# Patient Record
Sex: FEMALE | Race: WHITE | Employment: PART TIME | URBAN - METROPOLITAN AREA
[De-identification: names, ages, dates, MRNs, and addresses within clinical notes are randomized per-mention and may not be internally consistent; named-entity substitution may affect disease eponyms.]

---

## 2017-11-29 ENCOUNTER — OFFICE VISIT (OUTPATIENT)
Dept: ORTHOPEDIC SURGERY | Age: 47
End: 2017-11-29

## 2017-11-29 VITALS — HEIGHT: 67 IN | BODY MASS INDEX: 25.11 KG/M2 | RESPIRATION RATE: 16 BRPM | WEIGHT: 160 LBS

## 2017-11-29 DIAGNOSIS — M25.361 PATELLAR INSTABILITY OF BOTH KNEES: Primary | ICD-10-CM

## 2017-11-29 DIAGNOSIS — R52 PAIN: ICD-10-CM

## 2017-11-29 DIAGNOSIS — M25.362 PATELLAR INSTABILITY OF BOTH KNEES: Primary | ICD-10-CM

## 2017-11-29 PROCEDURE — G8484 FLU IMMUNIZE NO ADMIN: HCPCS | Performed by: ORTHOPAEDIC SURGERY

## 2017-11-29 PROCEDURE — 99214 OFFICE O/P EST MOD 30 MIN: CPT | Performed by: ORTHOPAEDIC SURGERY

## 2017-11-29 PROCEDURE — G8427 DOCREV CUR MEDS BY ELIG CLIN: HCPCS | Performed by: ORTHOPAEDIC SURGERY

## 2017-11-29 PROCEDURE — 1036F TOBACCO NON-USER: CPT | Performed by: ORTHOPAEDIC SURGERY

## 2017-11-29 PROCEDURE — G8419 CALC BMI OUT NRM PARAM NOF/U: HCPCS | Performed by: ORTHOPAEDIC SURGERY

## 2017-11-29 ASSESSMENT — ENCOUNTER SYMPTOMS
GASTROINTESTINAL NEGATIVE: 1
RESPIRATORY NEGATIVE: 1
EYES NEGATIVE: 1

## 2017-11-29 NOTE — PROGRESS NOTES
Scribe Authentication Statement  Ubaldo Kim, scribed portions of this documentation for and in the presence of Dr. Ulysses Redwood, DO on 11/29/17 at 1:53 PM.    Provider Authentication Statement:  I, Rajendra Javier DO, personally performed the services described in this documentation and they were scribed in my presence by the above listed scribe. The documentation is both accurate and complete. ORTHOPEDIC EVALUATION      History of Present Illness (HPI):   Patient's PCP is listed as: No primary care provider on file. .    This is a:  []Consult Visit     []New Patient Visit     [x]Established Patient Visit    Jose Valle is a 41y.o. year old female evaluated for   Chief Complaint   Patient presents with    Knee Pain     bilateral       Informant patient   Setting when problem first occurred home   Mechanism unknown    Location where pain is most intense Bilateral, anterior   Quality / Description aching and sharp, swelling on right    Severity / Intensity    moderate   Onset Since she was a teenager. On-and-off since then. Timing waxing and waning but worse overall   Radiates does not radiate   Aggravating factors activity   Relieving factors avoiding painful activities   Associated manifestations denies erythema or warmth and denies numbness, tingling, weakness. Buckling but none recent. occasional patella subluxation, but no dislocations. Occasional popping and cracking   Previous tests and diagnostic procedures none   Previous problems in this area none   Previous treatment Therapy many years ago, none recently. Not performing any HEP at this time. Effect on patient's life difficulty with ascending stairs, ladders, or getting up from a squat. Review of Systems (ROS):   Problem = 1 , Extended = 2-9 , Complete = 10  Unless otherwise specified in this note, the R.O.S. is reviewed today with the patient and is as below-    Review of Systems   Constitutional: Negative. HENT: Negative.     Eyes: []Gurney  Weight bearing on injured extremity:  [x]Is able   []Is not able    ORTHOPEDIC KNEE EXAM:     KNEE EXAM:  [x]Right []Left  Circulation:  [x] The limb is warm and well perfused. [x] Capillary refill is intact. [] Edema:    Inspection:  [x] Skin intact without abrasion or lacerations. [x] Leg lengths equal  [] Unequal leg length:  [] Ecchymosis:  [] Abrasion:  [] Laceration:   [] Scar / Surgical incision:  [] Orthopedic appliance:  [] Joint effusion:  []Mild   []Moderate   []Severe    Alignment:  [x] Normal Knee Alignment   Normal Measured Findings Passively Correctable to Normal   Valgus Alignment: []  []   Varus Alignment: []  []     Range of Motion:  [] Normal Knee AROM     [] Normal Knee PROM     [] Deferred due to fracture  Active Knee Flexion: 130° [x]AROM = PROM   Active Knee Extension: full [x]AROM = PROM   Passive Knee Flexion:  []AROM = PROM   Passive Knee Extension:  []AROM = PROM     Motor Function:  [x] No gross motor weakness of hip [x] No gross motor weakness of knee  [x] No gross motor weakness of ankle    [x] No gross motor weakness of great toe  [] Motor weakness:     Neurologic:  [x] Sensation to light touch intact. [] Impaired:  [x] Deep tendon reflexes intact. [] Impaired:  [x] Coordination / proprioception intact. [] Impaired:     Palpation: (Not tested if not marked)     Normal Tenderness Swelling Crepitation Calor   Lateral Joint Line: [x] [] [] [] []   Lateral Collateral Ligament: [] [x] [] [] []   Fibula Head: [x] [] [] [] []   I.T.  Band: [x] [] [] [] []   Lateral Hamstrings: [x] [] [] [] []   Quad tendon: [x] [] [] [] []   Patella: [x] [] [] [] []   Prepatellar Bursa: [x] [] [] [] []   Patella Tendon: [x] [] [] [] []   Tibial Tubercle: [x] [] [] [] []   Medial Joint Line: [] [x] [] [] []   Pes Bursa: [x] [] [] [] []   Pes Tendons: [x] [] [] [] []   Medial Collateral Ligament: [x] [] [] [] []   Popliteal Fossa: [x] [] [] [] []   Medial Head Gastrocnemius: [x] [] [] [] []

## 2017-11-29 NOTE — PATIENT INSTRUCTIONS
· Diagnostic and treatment options discussed. Diagnosis explained. Natural history discussed. Patient's questions answered.   · Physical therapy  · Home exercises given  · Return to the office after therapy

## 2018-02-06 PROBLEM — G47.33 OSA (OBSTRUCTIVE SLEEP APNEA): Status: ACTIVE | Noted: 2018-02-06

## 2018-05-29 PROBLEM — G47.33 OSA ON CPAP: Status: ACTIVE | Noted: 2018-05-29

## 2018-05-29 PROBLEM — Z99.89 OSA ON CPAP: Status: ACTIVE | Noted: 2018-05-29

## 2018-07-25 ENCOUNTER — HOSPITAL ENCOUNTER (OUTPATIENT)
Dept: PHYSICAL THERAPY | Age: 48
Discharge: HOME | End: 2018-07-31

## 2018-07-25 NOTE — PLAN OF CARE
Outpatient Physical Therapy        [] Phone: [phone]   Fax: [phone]   Pediatric Therapy          [] Phone: [phone]   Fax: [phone]  [facility]          [] Phone: [phone]   Fax: [phone]      To: Referring Practitioner: Dr. Lux Dan    From: Marcela Schwartz, PT     Patient: Gena Donovan       : 1970  Diagnosis: maltracking of right and left patellas       Date: 2018    Physical Therapy Certification/Re-Certification Form  Dear Dr. Tahira Euceda following patient has been evaluated for physical therapy services and for therapy to continue, Please review the attached evaluation and/or summary of the patient's plan of care, and verify that you agree therapy should continue by signing the attached document and sending it back to our office. Assessment:Clinical Presentation: stable  Pt presents with chronic excessive  lateral malalignment of both patella ,  Pain is reported 2 to 8/10 . Pt has stable knee ligaments, good strength, mild foot pronation , and normal hip mobility. Today she started with a strengthening exs of hip and knee mm, stretching hamstrings, trial of kinesiotape to limit lateral subluxation of patella. Pt states she will be on her husbands insurance in a few weeks and requests to return to PT when she has insurance. She easily learned the home exs and asked to exercise 5x a  Week. Planned Services:  [x] Therapeutic Exercise    [] Aquatics:  [] Therapeutic Activity    [] Ultrasound  [] Elec Stimulation  [] Gait Training     [] Cervical Traction [] Lumbar Traction  [x] Neuromuscular Re-education [] Cold/hotpack [] Iontophoresis   [x] Instruction in HEP       [] Manual Therapy     [] vasopneumatic            [] Self care home management        []Dry needling trigger point point/pain management      Plan of Care Date Range:      ? Frequency/Duration:  # Days per week: [x] 1 day # Weeks: [] 1 week [] 5 weeks     [] 2 days?    [] 2 weeks [] 6 weeks     [] 3

## 2018-07-25 NOTE — FLOWSHEET NOTE
Physical Therapy Daily Treatment Note  Date:  2018    Patient Name:  Donovan Swenson    :  1970  MRN: [id number]      Diagnosis: maltracking of right and left patellas     PT Insurance Information: no insurance yet  Referring Practitioner: Dr. Beth Nielson  Referring Practitioner Follow-Up:     POC Signed: pending  POC Date Range:    Progress Note Due:    Visit# / total visits:  /                    Goals:     Long term goals  Time Frame for Long term goals : 60 days  Long term goal 1: decrease pain 50% or more to 0 to 3/10  Long term goal 2: indep with home program  Long term goal 3: improve functional score KOOS by 4 points  Patient goals : no knee pain    Summary of Eval:   Clinical Presentation: stable  Pt presents with chronic excessive  lateral malalignment of both patella ,  Pain is reported 2 to 8/10 . Pt has stable knee ligaments, good strength, mild foot pronation , and normal hip mobility. Today she started with a strengthening exs of hip and knee mm, stretching hamstrings, trial of kinesiotape to limit lateral subluxation of patella. Pt states she will be on her husbands insurance in a few weeks and requests to return to PT when she has insurance. She easily learned the home exs and asked to exercise 5x weeks    INITIAL PAIN LEVEL:  /10   SUBJECTIVE:      Any changes to Ambulatory Summary Sheet? Any major status changes? Skilled PT activities: Date 18 Date Date Date   Outcome measure   KOOS 11         VMO 10x each hold 5 sec work up to 10 sec        Clamshell 1 and 2 10x each hold 10 sec         Stretch hamstrings sitting  3x 20 sec each        Wall sits knees to 35 degree flex only  3x 10 sec comfortable        Walk sideways  Blue tband above knees  2 laps        kinesiotape lateral patella to limit lateral subluxation 2 C strips applied,  Pt taped one knee and I did the other. She is indepn with taping.     Extra tape given

## 2018-07-25 NOTE — PROGRESS NOTES
Preffered learning style:   Demonstration  Written form   Verbal instruction   All     Assessment   Conditions Requiring Skilled Therapeutic Intervention  Body structures, Functions, Activity limitations: Decreased functional mobility   Prognosis: Good  Decision Making: Low Complexity  History: orthopedic statement  Exam: range of motion, strength, gait, lig stability  Clinical Presentation: stable  Pt presents with chronic excessive  lateral malalignment of both patella ,  Pain is reported 2 to 8/10 . Pt has stable knee ligaments, good strength, mild foot pronation , and normal hip mobility. Today she started with a strengthening exs of hip and knee mm, stretching hamstrings, trial of kinesiotape to limit lateral subluxation of patella. Pt states she will be on her husbands insurance in a few weeks and requests to return to PT when she has insurance. She easily learned the home exs and asked to exercise 5x weeks  Patient Education: written  Barriers to Learning: none  REQUIRES PT FOLLOW UP: Yes         Plan   Plan  Times per week: 1 x week  Plan weeks: 4 to 12 weeks   Current Treatment Recommendations: Strengthening, Home Exercise Program, Neuromuscular Re-education    G-Code  PT G-Codes  Functional Assessment Tool Used: koos  Score: 11  Functional Limitation: Mobility: Walking and moving around  Mobility: Walking and Moving Around Current Status (): At least 20 percent but less than 40 percent impaired, limited or restricted  Mobility: Walking and Moving Around Goal Status ():  At least 1 percent but less than 20 percent impaired, limited or restricted    Goals  Long term goals  Time Frame for Long term goals : 60 days  Long term goal 1: decrease pain 50% or more to 0 to 3/10  Long term goal 2: indep with home program  Long term goal 3: improve functional score KOOS by 4 points  Patient Goals   Patient goals : no knee pain       Therapy Time   Individual Concurrent Group Co-treatment   Time In 1500         Time Out 1545         Minutes 45         Timed Code Treatment Minutes: Tolu 83, PT

## 2018-08-01 ENCOUNTER — HOSPITAL ENCOUNTER (OUTPATIENT)
Dept: OTHER | Age: 48
Discharge: HOME | End: 2018-08-31

## 2019-08-13 ENCOUNTER — HOSPITAL ENCOUNTER (EMERGENCY)
Age: 49
Discharge: HOME | End: 2019-08-13
Payer: COMMERCIAL

## 2019-08-13 VITALS
DIASTOLIC BLOOD PRESSURE: 88 MMHG | TEMPERATURE: 98.2 F | RESPIRATION RATE: 18 BRPM | OXYGEN SATURATION: 99 % | HEIGHT: 67 IN | WEIGHT: 173 LBS | SYSTOLIC BLOOD PRESSURE: 153 MMHG | BODY MASS INDEX: 27.15 KG/M2 | HEART RATE: 90 BPM

## 2019-08-13 DIAGNOSIS — S61.210A LACERATION OF RIGHT INDEX FINGER WITHOUT FOREIGN BODY WITHOUT DAMAGE TO NAIL, INITIAL ENCOUNTER: Primary | ICD-10-CM

## 2019-08-13 PROCEDURE — 99282 EMERGENCY DEPT VISIT SF MDM: CPT

## 2019-08-13 PROCEDURE — 2500000003 HC RX 250 WO HCPCS: Performed by: PHYSICIAN ASSISTANT

## 2019-08-13 PROCEDURE — 90715 TDAP VACCINE 7 YRS/> IM: CPT | Performed by: PHYSICIAN ASSISTANT

## 2019-08-13 PROCEDURE — 6360000002 HC RX W HCPCS: Performed by: PHYSICIAN ASSISTANT

## 2019-08-13 PROCEDURE — 4500000027

## 2019-08-13 PROCEDURE — 90471 IMMUNIZATION ADMIN: CPT | Performed by: PHYSICIAN ASSISTANT

## 2019-08-13 RX ORDER — LIDOCAINE HYDROCHLORIDE 20 MG/ML
5 INJECTION, SOLUTION EPIDURAL; INFILTRATION; INTRACAUDAL; PERINEURAL ONCE
Status: COMPLETED | OUTPATIENT
Start: 2019-08-13 | End: 2019-08-13

## 2019-08-13 RX ADMIN — TETANUS TOXOID, REDUCED DIPHTHERIA TOXOID AND ACELLULAR PERTUSSIS VACCINE, ADSORBED 0.5 ML: 5; 2.5; 8; 8; 2.5 SUSPENSION INTRAMUSCULAR at 17:20

## 2019-08-13 RX ADMIN — LIDOCAINE HYDROCHLORIDE 5 ML: 20 INJECTION, SOLUTION EPIDURAL; INFILTRATION; INTRACAUDAL; PERINEURAL at 17:20

## 2019-08-13 ASSESSMENT — PAIN DESCRIPTION - PAIN TYPE: TYPE: ACUTE PAIN

## 2019-08-13 ASSESSMENT — PAIN DESCRIPTION - DESCRIPTORS: DESCRIPTORS: ACHING

## 2019-08-13 ASSESSMENT — PAIN SCALES - GENERAL: PAINLEVEL_OUTOF10: 6

## 2019-08-13 ASSESSMENT — PAIN DESCRIPTION - LOCATION: LOCATION: FINGER (COMMENT WHICH ONE)

## 2019-08-13 ASSESSMENT — PAIN DESCRIPTION - ORIENTATION: ORIENTATION: RIGHT

## 2019-08-13 NOTE — ED PROVIDER NOTES
GABRIELLA      Laceration Repair Procedure Note    Indication: Skin Laceration - right index finger    Procedure:   - Procedure explained, including risks and benefits explained to the patient who expressed understanding. All questions were answered. Verbal consent obtained. - The Wound was prepped and draped in the usual sterile fashion using Hibiclens and sterile saline.  - The wound is anesthetized using 2% lidocaine using 1.5 mL  - Wound was explored to it's depth, no compromise of neurovascular or tendon structures, no foreign bodies. - Wound was irrigated with copious amounts of sterile saline and mechanically debrided utilizing sterile gauze. - The laceration was Closed with 5-0 prolene sutures, total number of 6, simple interrupted  - Hemostasis and good cosmesis was achieved. Blood loss minimal.  - The wound area was then dressed with Sterile nonstick dressing, sterile gauze, and tape. - Patient tolerated procedure well without complications. Total repaired wound length: 3 cm  ________________________________________________________________________          ED COURSE & MEDICAL DECISION MAKING      Presents as above. No bony tenderness. Patient up-to-date on tetanus. I discussed possibility of infection, retained foreign body, tendon injury, nerve injury. See above procedure note. Clinical  IMPRESSION    1. Laceration of right index finger without foreign body without damage to nail, initial encounter        Wound care instructions discussed with patient today. Wound check in 2-3 days. Suture removal in 10 days. (discussed today). Diagnosis and plan discussed in detail with patient who understands and agrees. Return to emergency Department precautions were discussed in detail with patient who understands and agrees.       Comment: Please note this report has been produced using speech recognition software and may contain errors related to that system including errors in grammar,

## 2021-11-18 ENCOUNTER — TELEPHONE (OUTPATIENT)
Dept: NEUROLOGY | Age: 51
End: 2021-11-18

## 2021-11-18 NOTE — TELEPHONE ENCOUNTER
Left message for patient to call and make appointment for EMG ordered by Dr. Eloy Alford in our [address] [place] [address] office.

## 2024-05-30 ENCOUNTER — PREP FOR PROCEDURE (OUTPATIENT)
Dept: PODIATRY | Facility: HOSPITAL | Age: 54
End: 2024-05-30
Payer: COMMERCIAL

## 2024-05-30 DIAGNOSIS — M20.12 VALGUS DEFORMITY OF BOTH GREAT TOES: Primary | ICD-10-CM

## 2024-05-30 DIAGNOSIS — M20.11 VALGUS DEFORMITY OF BOTH GREAT TOES: Primary | ICD-10-CM

## 2024-06-18 ENCOUNTER — ANESTHESIA EVENT (OUTPATIENT)
Dept: OPERATING ROOM | Facility: HOSPITAL | Age: 54
End: 2024-06-18
Payer: COMMERCIAL

## 2024-06-20 ENCOUNTER — PHARMACY VISIT (OUTPATIENT)
Dept: PHARMACY | Facility: CLINIC | Age: 54
End: 2024-06-20
Payer: MEDICARE

## 2024-06-20 ENCOUNTER — ANESTHESIA (OUTPATIENT)
Dept: OPERATING ROOM | Facility: HOSPITAL | Age: 54
End: 2024-06-20
Payer: COMMERCIAL

## 2024-06-20 ENCOUNTER — HOSPITAL ENCOUNTER (OUTPATIENT)
Dept: CARDIOLOGY | Facility: HOSPITAL | Age: 54
Discharge: HOME | End: 2024-06-20
Payer: COMMERCIAL

## 2024-06-20 ENCOUNTER — HOSPITAL ENCOUNTER (OUTPATIENT)
Dept: RADIOLOGY | Facility: HOSPITAL | Age: 54
Discharge: HOME | End: 2024-06-20
Payer: COMMERCIAL

## 2024-06-20 ENCOUNTER — HOSPITAL ENCOUNTER (OUTPATIENT)
Facility: HOSPITAL | Age: 54
Setting detail: OUTPATIENT SURGERY
Discharge: HOME | End: 2024-06-20
Attending: PODIATRIST | Admitting: PODIATRIST
Payer: COMMERCIAL

## 2024-06-20 VITALS
DIASTOLIC BLOOD PRESSURE: 78 MMHG | SYSTOLIC BLOOD PRESSURE: 129 MMHG | RESPIRATION RATE: 16 BRPM | HEART RATE: 72 BPM | TEMPERATURE: 97.2 F | HEIGHT: 67 IN | BODY MASS INDEX: 26.96 KG/M2 | WEIGHT: 171.74 LBS | OXYGEN SATURATION: 96 %

## 2024-06-20 DIAGNOSIS — M20.11 VALGUS DEFORMITY OF BOTH GREAT TOES: Primary | ICD-10-CM

## 2024-06-20 DIAGNOSIS — M20.10 HALLUX ABDUCTO VALGUS: ICD-10-CM

## 2024-06-20 DIAGNOSIS — M20.12 VALGUS DEFORMITY OF BOTH GREAT TOES: Primary | ICD-10-CM

## 2024-06-20 PROCEDURE — 7100000002 HC RECOVERY ROOM TIME - EACH INCREMENTAL 1 MINUTE: Performed by: PODIATRIST

## 2024-06-20 PROCEDURE — C1713 ANCHOR/SCREW BN/BN,TIS/BN: HCPCS | Performed by: PODIATRIST

## 2024-06-20 PROCEDURE — 7100000010 HC PHASE TWO TIME - EACH INCREMENTAL 1 MINUTE: Performed by: PODIATRIST

## 2024-06-20 PROCEDURE — 2720000007 HC OR 272 NO HCPCS: Performed by: PODIATRIST

## 2024-06-20 PROCEDURE — 7100000009 HC PHASE TWO TIME - INITIAL BASE CHARGE: Performed by: PODIATRIST

## 2024-06-20 PROCEDURE — 3600000008 HC OR TIME - EACH INCREMENTAL 1 MINUTE - PROCEDURE LEVEL THREE: Performed by: PODIATRIST

## 2024-06-20 PROCEDURE — 2500000005 HC RX 250 GENERAL PHARMACY W/O HCPCS: Performed by: PODIATRIST

## 2024-06-20 PROCEDURE — 2500000004 HC RX 250 GENERAL PHARMACY W/ HCPCS (ALT 636 FOR OP/ED): Performed by: PODIATRIST

## 2024-06-20 PROCEDURE — 2500000004 HC RX 250 GENERAL PHARMACY W/ HCPCS (ALT 636 FOR OP/ED): Performed by: ANESTHESIOLOGY

## 2024-06-20 PROCEDURE — 2500000005 HC RX 250 GENERAL PHARMACY W/O HCPCS: Performed by: NURSE ANESTHETIST, CERTIFIED REGISTERED

## 2024-06-20 PROCEDURE — 76000 FLUOROSCOPY <1 HR PHYS/QHP: CPT

## 2024-06-20 PROCEDURE — 2500000004 HC RX 250 GENERAL PHARMACY W/ HCPCS (ALT 636 FOR OP/ED): Performed by: NURSE ANESTHETIST, CERTIFIED REGISTERED

## 2024-06-20 PROCEDURE — 7100000001 HC RECOVERY ROOM TIME - INITIAL BASE CHARGE: Performed by: PODIATRIST

## 2024-06-20 PROCEDURE — RXMED WILLOW AMBULATORY MEDICATION CHARGE

## 2024-06-20 PROCEDURE — 93005 ELECTROCARDIOGRAM TRACING: CPT

## 2024-06-20 PROCEDURE — 2780000003 HC OR 278 NO HCPCS: Performed by: PODIATRIST

## 2024-06-20 PROCEDURE — 3600000003 HC OR TIME - INITIAL BASE CHARGE - PROCEDURE LEVEL THREE: Performed by: PODIATRIST

## 2024-06-20 PROCEDURE — 3700000002 HC GENERAL ANESTHESIA TIME - EACH INCREMENTAL 1 MINUTE: Performed by: PODIATRIST

## 2024-06-20 PROCEDURE — 3700000001 HC GENERAL ANESTHESIA TIME - INITIAL BASE CHARGE: Performed by: PODIATRIST

## 2024-06-20 DEVICE — IMPLANTABLE DEVICE: Type: IMPLANTABLE DEVICE | Site: FOOT | Status: FUNCTIONAL

## 2024-06-20 RX ORDER — MIDAZOLAM HYDROCHLORIDE 1 MG/ML
INJECTION INTRAMUSCULAR; INTRAVENOUS AS NEEDED
Status: DISCONTINUED | OUTPATIENT
Start: 2024-06-20 | End: 2024-06-20

## 2024-06-20 RX ORDER — LIDOCAINE HYDROCHLORIDE 20 MG/ML
INJECTION, SOLUTION INFILTRATION; PERINEURAL AS NEEDED
Status: DISCONTINUED | OUTPATIENT
Start: 2024-06-20 | End: 2024-06-20

## 2024-06-20 RX ORDER — ONDANSETRON HYDROCHLORIDE 2 MG/ML
INJECTION, SOLUTION INTRAVENOUS AS NEEDED
Status: DISCONTINUED | OUTPATIENT
Start: 2024-06-20 | End: 2024-06-20

## 2024-06-20 RX ORDER — LIDOCAINE HYDROCHLORIDE 10 MG/ML
0.1 INJECTION, SOLUTION EPIDURAL; INFILTRATION; INTRACAUDAL; PERINEURAL ONCE
Status: DISCONTINUED | OUTPATIENT
Start: 2024-06-20 | End: 2024-06-20 | Stop reason: HOSPADM

## 2024-06-20 RX ORDER — ONDANSETRON HYDROCHLORIDE 2 MG/ML
8 INJECTION, SOLUTION INTRAVENOUS ONCE
Status: DISCONTINUED | OUTPATIENT
Start: 2024-06-20 | End: 2024-06-20 | Stop reason: HOSPADM

## 2024-06-20 RX ORDER — ALBUTEROL SULFATE 0.83 MG/ML
2.5 SOLUTION RESPIRATORY (INHALATION) ONCE AS NEEDED
Status: DISCONTINUED | OUTPATIENT
Start: 2024-06-20 | End: 2024-06-20 | Stop reason: HOSPADM

## 2024-06-20 RX ORDER — CEFAZOLIN 1 G/1
INJECTION, POWDER, FOR SOLUTION INTRAVENOUS AS NEEDED
Status: DISCONTINUED | OUTPATIENT
Start: 2024-06-20 | End: 2024-06-20

## 2024-06-20 RX ORDER — LIDOCAINE HYDROCHLORIDE 10 MG/ML
INJECTION INFILTRATION; PERINEURAL AS NEEDED
Status: DISCONTINUED | OUTPATIENT
Start: 2024-06-20 | End: 2024-06-20 | Stop reason: HOSPADM

## 2024-06-20 RX ORDER — BUPIVACAINE HYDROCHLORIDE 5 MG/ML
INJECTION, SOLUTION EPIDURAL; INTRACAUDAL AS NEEDED
Status: DISCONTINUED | OUTPATIENT
Start: 2024-06-20 | End: 2024-06-20 | Stop reason: HOSPADM

## 2024-06-20 RX ORDER — IBUPROFEN 200 MG
400 TABLET ORAL EVERY 6 HOURS
COMMUNITY

## 2024-06-20 RX ORDER — ACETAMINOPHEN 325 MG/1
650 TABLET ORAL EVERY 4 HOURS PRN
Status: DISCONTINUED | OUTPATIENT
Start: 2024-06-20 | End: 2024-06-20 | Stop reason: HOSPADM

## 2024-06-20 RX ORDER — FENTANYL CITRATE 50 UG/ML
INJECTION, SOLUTION INTRAMUSCULAR; INTRAVENOUS AS NEEDED
Status: DISCONTINUED | OUTPATIENT
Start: 2024-06-20 | End: 2024-06-20

## 2024-06-20 RX ORDER — SODIUM CHLORIDE, SODIUM LACTATE, POTASSIUM CHLORIDE, CALCIUM CHLORIDE 600; 310; 30; 20 MG/100ML; MG/100ML; MG/100ML; MG/100ML
100 INJECTION, SOLUTION INTRAVENOUS CONTINUOUS
Status: DISCONTINUED | OUTPATIENT
Start: 2024-06-20 | End: 2024-06-20 | Stop reason: HOSPADM

## 2024-06-20 RX ORDER — PROPOFOL 10 MG/ML
INJECTION, EMULSION INTRAVENOUS AS NEEDED
Status: DISCONTINUED | OUTPATIENT
Start: 2024-06-20 | End: 2024-06-20

## 2024-06-20 RX ORDER — DEXMEDETOMIDINE IN 0.9 % NACL 20 MCG/5ML
SYRINGE (ML) INTRAVENOUS AS NEEDED
Status: DISCONTINUED | OUTPATIENT
Start: 2024-06-20 | End: 2024-06-20

## 2024-06-20 RX ORDER — OXYCODONE AND ACETAMINOPHEN 5; 325 MG/1; MG/1
1 TABLET ORAL EVERY 6 HOURS PRN
Qty: 28 TABLET | Refills: 0 | Status: SHIPPED | OUTPATIENT
Start: 2024-06-20 | End: 2024-06-27

## 2024-06-20 RX ORDER — PHENYLEPHRINE HCL IN 0.9% NACL 1 MG/10 ML
SYRINGE (ML) INTRAVENOUS AS NEEDED
Status: DISCONTINUED | OUTPATIENT
Start: 2024-06-20 | End: 2024-06-20

## 2024-06-20 RX ORDER — DEXMEDETOMIDINE HYDROCHLORIDE 4 UG/ML
.1-1.5 INJECTION, SOLUTION INTRAVENOUS CONTINUOUS
Status: CANCELLED | OUTPATIENT
Start: 2024-06-20 | End: 2024-06-20

## 2024-06-20 RX ADMIN — SODIUM CHLORIDE, POTASSIUM CHLORIDE, SODIUM LACTATE AND CALCIUM CHLORIDE 100 ML/HR: 600; 310; 30; 20 INJECTION, SOLUTION INTRAVENOUS at 13:11

## 2024-06-20 SDOH — HEALTH STABILITY: MENTAL HEALTH: CURRENT SMOKER: 1

## 2024-06-20 ASSESSMENT — COLUMBIA-SUICIDE SEVERITY RATING SCALE - C-SSRS
2. HAVE YOU ACTUALLY HAD ANY THOUGHTS OF KILLING YOURSELF?: NO
6. HAVE YOU EVER DONE ANYTHING, STARTED TO DO ANYTHING, OR PREPARED TO DO ANYTHING TO END YOUR LIFE?: NO
1. IN THE PAST MONTH, HAVE YOU WISHED YOU WERE DEAD OR WISHED YOU COULD GO TO SLEEP AND NOT WAKE UP?: NO

## 2024-06-20 ASSESSMENT — PAIN SCALES - GENERAL
PAINLEVEL_OUTOF10: 0 - NO PAIN
PAINLEVEL_OUTOF10: 0 - NO PAIN

## 2024-06-20 ASSESSMENT — PAIN - FUNCTIONAL ASSESSMENT: PAIN_FUNCTIONAL_ASSESSMENT: 0-10

## 2024-06-20 NOTE — ANESTHESIA PROCEDURE NOTES
Airway  Date/Time: 6/20/2024 1:51 PM  Urgency: elective    Airway not difficult    Staffing  Performed: CRNA   Authorized by: LENIN Guzman-FRANCES    Performed by: LENIN Guzman-FRANCES  Patient location during procedure: OR    Indications and Patient Condition  Indications for airway management: anesthesia and airway protection  Spontaneous ventilation: present  Sedation level: deep  Preoxygenated: yes  Mask difficulty assessment: 0 - not attempted    Final Airway Details  Final airway type: supraglottic airway      Successful airway: air-Q  Size 4     Number of attempts at approach: 1

## 2024-06-20 NOTE — ANESTHESIA POSTPROCEDURE EVALUATION
Patient: Toño Costa    Procedure Summary       Date: 06/20/24 Room / Location: [facility] OR 08 / [facility]    Anesthesia Start: 1345 Anesthesia Stop: 1548    Procedure: REPAIR HALLUX VALGUS (Bilateral: Foot) Diagnosis:       Valgus deformity of both great toes      (Valgus deformity of both great toes [M20.11, M20.12])    Surgeons: Huma Caraballo DPM Responsible Provider: EVANGELISTA Guzman    Anesthesia Type: general ASA Status: 2            Anesthesia Type: general    Vitals Value Taken Time   /71 06/20/24 1546   Temp 36.2 °C (97.2 °F) 06/20/24 1546   Pulse 79 06/20/24 1546   Resp 15 06/20/24 1546   SpO2 99 % 06/20/24 1546       Anesthesia Post Evaluation    Patient location during evaluation: PACU  Patient participation: complete - patient participated  Level of consciousness: awake  Pain management: adequate  Multimodal analgesia pain management approach  Airway patency: patent  Two or more strategies used to mitigate risk of obstructive sleep apnea  Cardiovascular status: acceptable  Respiratory status: acceptable  Hydration status: acceptable  Postoperative Nausea and Vomiting: none        No notable events documented.

## 2024-06-20 NOTE — H&P
"History Of Present Illness  Toño Costa is a 54 y.o. female presenting with B/L hallux valgus.  Has exhausted conservative treatment..     Past Medical History  Past Medical History:   Diagnosis Date    Right ankle sprain 05/31/2023    Valgus deformity of both great toes        Surgical History  Past Surgical History:   Procedure Laterality Date    COLONOSCOPY Bilateral 2021        Social History  She has no history on file for tobacco use, alcohol use, and drug use.    Family History  No family history on file.     Allergies  Patient has no allergy information on record.    Review of Systems   Negative except for B/L hallux joint pain    Physical Exam  Constitutional:       Appearance: Normal appearance.   HENT:      Head: Normocephalic.      Nose: Nose normal.      Mouth/Throat:      Mouth: Mucous membranes are moist.      Pharynx: Oropharynx is clear.   Eyes:      Extraocular Movements: Extraocular movements intact.      Pupils: Pupils are equal, round, and reactive to light.   Cardiovascular:      Rate and Rhythm: Normal rate and regular rhythm.   Pulmonary:      Effort: Pulmonary effort is normal.   Abdominal:      General: Abdomen is flat. Bowel sounds are normal.   Musculoskeletal:         General: Deformity and signs of injury present.      Cervical back: Normal range of motion.      Right foot: Bunion present.      Left foot: Bunion present.   Skin:     General: Skin is warm.   Neurological:      General: No focal deficit present.      Mental Status: She is alert.   Psychiatric:         Mood and Affect: Mood normal.          Last Recorded Vitals  Blood pressure (!) 169/97, pulse 73, temperature 36.6 °C (97.9 °F), resp. rate 16, height 1.702 m (5' 7\"), weight 77.9 kg (171 lb 11.8 oz), SpO2 99%.    Relevant Results        Assessment/Plan   Principal Problem:    Valgus deformity of both great toes       B/L hallux valgus  Plan for OR bunionectomy B/L feet.       I spent 15 minutes in the professional and " overall care of this patient.      Huma Caraballo DPM

## 2024-06-20 NOTE — ANESTHESIA PREPROCEDURE EVALUATION
"Patient: Toño Costa    Procedure Information       Date/Time: 06/20/24 1330    Procedure: REPAIR HALLUX VALGUS (Bilateral)    Location: [facility] / [facility]    Surgeons: Huma Caraballo DPM     Vitals:    06/20/24 1219   BP: (!) 169/97   Pulse: 73   Resp: 16   Temp: 36.6 °C (97.9 °F)   SpO2: 99%       Past Surgical History:   Procedure Laterality Date    COLONOSCOPY Bilateral 2021     Past Medical History:   Diagnosis Date    Right ankle sprain 05/31/2023    Sleep apnea     Valgus deformity of both great toes        Current Facility-Administered Medications:     lactated Ringer's infusion, 100 mL/hr, intravenous, Continuous, Jackson Chu MD, Last Rate: 100 mL/hr at 06/20/24 1311, 100 mL/hr at 06/20/24 1311  Prior to Admission medications    Medication Sig Start Date End Date Taking? Authorizing Provider   ibuprofen 200 mg tablet Take 2 tablets (400 mg) by mouth every 6 hours.   Yes Historical Provider, MD     No Known Allergies  Social History     Tobacco Use    Smoking status: Every Day     Current packs/day: 1.00     Average packs/day: 1 pack/day for 35.5 years (35.5 ttl pk-yrs)     Types: Cigarettes     Start date: 1989    Smokeless tobacco: Never   Substance Use Topics    Alcohol use: Never         Chemistry    No results found for: \"NA\", \"K\", \"CL\", \"CO2\", \"BUN\", \"CREATININE\", \"GLU\" No results found for: \"CALCIUM\", \"ALKPHOS\", \"AST\", \"ALT\", \"BILITOT\"       No results found for: \"WBC\", \"HGB\", \"HCT\", \"PLT\"  No results found for: \"PROTIME\", \"PTT\", \"INR\"  No results found for this or any previous visit (from the past 4464 hour(s)).  No results found for this or any previous visit from the past 1095 days.        Relevant Problems   No relevant active problems       Clinical information reviewed:   Tobacco  Allergies  Meds   Med Hx  Surg Hx  OB Status  Fam Hx  Soc   Hx        NPO Detail:  NPO/Void Status  Date of Last Liquid: 06/20/24  Time of Last Liquid: 0000  Date of Last Solid: " 06/20/24  Time of Last Solid: 0000         Physical Exam    Airway  Mallampati: II  TM distance: >3 FB  Neck ROM: full     Cardiovascular - normal exam     Dental    Pulmonary - normal exam  (+) decreased breath sounds     Abdominal - normal exam           Anesthesia Plan    History of general anesthesia?: yes  History of complications of general anesthesia?: no    ASA 2     general     The patient is a current smoker.    intravenous induction   Postoperative administration of opioids is intended.  Trial extubation is planned.  Anesthetic plan and risks discussed with patient.  Use of blood products discussed with patient who.    Plan discussed with CRNA and attending.

## 2024-06-20 NOTE — BRIEF OP NOTE
Date: 2024  OR Location: [facility] OR    Name: Toño Costa, : 1970, Age: 54 y.o., MRN: [id number], Sex: female    Diagnosis  Pre-op Diagnosis     * Valgus deformity of both great toes [M20.11, M20.12] Post-op Diagnosis     * Valgus deformity of both great toes [M20.11, M20.12]     Procedures  Chevron-Barry Bunionectomies B/L     Surgeons      * Huma Caraballo - Primary    Resident/Fellow/Other Assistant:  Surgeons and Role:  * No surgeons found with a matching role *    Procedure Summary  Anesthesia: General  ASA: II  Anesthesia Staff: Anesthesiologist: Jackson Chu MD; Toan Dwyer DO  CRNA: LENIN Guzman-CRNA  Estimated Blood Loss: 5 mL  Intra-op Medications:   Administrations occurring from 1330 to 1530 on 24:   Medication Name Total Dose   lidocaine (Xylocaine) 10 mg/mL (1 %) injection 5 mL   BUPivacaine HCl (Marcaine) 0.5 % (5 mg/mL) 5 mL, lidocaine PF (Xylocaine) 10 mg/mL (1 %) 5 mL syringe 10 mL   BUPivacaine HCl (Marcaine) 0.5 % (5 mg/mL) 5 mL, lidocaine PF (Xylocaine) 10 mg/mL (1 %) 5 mL syringe 10 mL              Anesthesia Record               Intraprocedure I/O Totals          Intake    lactated Ringer's infusion 1000.00 mL    Total Intake 1000 mL          Specimen: No specimens collected     Staff:   Circulator: Sydney Rogers Person: Rosalina  Circulator: Christal          Findings: as dictated    Complications:  None; patient tolerated the procedure well.     Disposition: PACU - hemodynamically stable.  Condition: stable  Specimens Collected: No specimens collected  Attending Attestation: I performed the procedure.    Huma Caraballo  Phone Number: [phone]

## 2024-06-21 LAB
ATRIAL RATE: 74 BPM
P AXIS: 50 DEGREES
P OFFSET: 211 MS
P ONSET: 163 MS
PR INTERVAL: 104 MS
Q ONSET: 215 MS
QRS COUNT: 12 BEATS
QRS DURATION: 88 MS
QT INTERVAL: 422 MS
QTC CALCULATION(BAZETT): 468 MS
QTC FREDERICIA: 452 MS
R AXIS: 24 DEGREES
T AXIS: -6 DEGREES
T OFFSET: 426 MS
VENTRICULAR RATE: 74 BPM

## 2024-06-22 NOTE — OP NOTE
Forms printed and given to provider to review and sign.   REPAIR HALLUX VALGUS (B) Operative Note     Date: 2024  OR Location: [facility] OR    Name: Toño Costa, : 1970, Age: 54 y.o., MRN: [id number], Sex: female    Diagnosis  Pre-op Diagnosis     * Valgus deformity of both great toes [M20.11, M20.12] Post-op Diagnosis     * Valgus deformity of both great toes [M20.11, M20.12]     Procedures  REPAIR HALLUX VALGUS  49223 - MS CORRJ HLX VLGS BNCTY SESMDC DSTL METAR OSTEOT      Surgeons      * Huma Caraballo - Primary    Resident/Fellow/Other Assistant:  Surgeons and Role:  * No surgeons found with a matching role *    Procedure Summary  Anesthesia: General  ASA: II  Anesthesia Staff: Anesthesiologist: Jackson Chu MD; Toan Dwyer DO  CRNA: LENIN Guzman-CRNA  Estimated Blood Loss: 5 mL  Intra-op Medications:   Administrations occurring from 1330 to 1530 on 24:   Medication Name Total Dose   lidocaine (Xylocaine) 10 mg/mL (1 %) injection 5 mL   BUPivacaine HCl (Marcaine) 0.5 % (5 mg/mL) 5 mL, lidocaine PF (Xylocaine) 10 mg/mL (1 %) 5 mL syringe 10 mL   BUPivacaine HCl (Marcaine) 0.5 % (5 mg/mL) 5 mL, lidocaine PF (Xylocaine) 10 mg/mL (1 %) 5 mL syringe 10 mL              Anesthesia Record               Intraprocedure I/O Totals          Intake    lactated Ringer's infusion 1000.00 mL    Total Intake 1000 mL          Specimen: No specimens collected     Staff:   Circulator: Sydney Rogers Person: Rosalina  Circulator: Christal         Drains and/or Catheters: * None in log *    Tourniquet Times:     Total Tourniquet Time Documented:  Calf (Left) - 52 minutes  Total: Calf (Left) - 52 minutes    Calf (Right) - 50 minutes  Total: Calf (Right) - 50 minutes      Implants:  Implants       Type Name Action Serial No.      Screw KWIRE, 1.15MM, NON THREADED, MINI - SNA - [id number] Implanted NA     Screw 3.0 x 20MM HEADED SCREW Implanted NA     Screw SCREW, HEADED, 3.0MM X 28MM, MINI - SNA - [id number] Implanted NA     Screw KWIRE, 1.15MM, NON THREADED,  MINI - SNA - [id number] Implanted NA     Screw SCREW, HEADED, 3.0 X 26MM, MINI - SNA - [id number] Implanted NA     Screw 3.0 x 18MM HEADED SCREW Implanted NA              Findings: as dictated    Indications: Toño Costa is an 54 y.o. female who is having surgery for Valgus deformity of both great toes [M20.11, M20.12].      The patient was seen in the preoperative area. The risks, benefits, complications, treatment options, non-operative alternatives, expected recovery and outcomes were discussed with the patient. The possibilities of reaction to medication, pulmonary aspiration, injury to surrounding structures, bleeding, recurrent infection, the need for additional procedures, failure to diagnose a condition, and creating a complication requiring transfusion or operation were discussed with the patient. The patient concurred with the proposed plan, giving informed consent.  The site of surgery was properly noted/marked if necessary per policy. The patient has been actively warmed in preoperative area. Preoperative antibiotics have been ordered and given within 1 hours of incision. Venous thrombosis prophylaxis are not indicated.    Procedure Details:   Patient brought to operating room and transferred to OR bed for surgical procedure.  Huddle and Time out performed.  After induction of general anesthesia 20 ml's of 1:1 mixture of 1% lidocaine plain and 0.5% marcaine plain injected around B/L 1st MPJ in ford block fashion. B/L feet scrubbed, prepped and draped with chloroprep.    The left foot was then exanguinated with an esmarc bandage and the tourniquet was inflated to 250 mmHg.    Attention was then directed to left foot  where a 5.0 cm incision was made from medial aspect of hallux IPJ proximally to midshaft of   1st metatarsal using a #15 blade.  The incision was carried down throught subcutaneous tissue with care being taken to retract and identify and neurovascular structures.  Any bleeding controlled  with bovie electrocautery.  At level of capsule, a medial capsular incision was made.  Any soft tissue was freed from the first MPJ.   Utilized a mcglammery elevator, the sesamoid apparatus was freed.  We then placed a malleable retractor under first metatarsal head and utilized a small power sagital saw to remove the medial emminance of first metatarsal head.  We then redirected our attention to medial aspect of first metatarsal head,  we created a chevron osteotomy in first metatarsal head utilizing a power sagital saw.  All four cortices were breached, thus allowing first metatarsal head to be slid in a lateral direction approximately 3 mm.   We then utilized a k-wire from jazmyne 3-0 cannulated screw set to fixate osteotomy in first metatarsal from proximal to distal across osteotomy site.  This was checked in AP and  lateral foot planes.  We then measured and countersinked for placement of jazmyne 3-0,  cannulated screw in standard AO technique.  This was checked in AP and lateral foot planes and was noted to be in excellent position.  The k-wire was then removed.  The redundant medial shelf was then removed with small power sagittal saw. We then utilized a power reciprocating rasp to round and rasp smooth the first metatarsal head.  We then flushed the incision with copious amounts of sterile saline. We then directed our attention to the base of proximal phalanx of hallux where using a power sagittal saw we created a triangle shaped osteotomy with the base oriented medially and apex laterally.  Removed 2 mm wedge of bone. We then utilized a k-wire from jazmyne 3-0 cannulated screw set to fixate osteotomy in proximal phalanx of hallux across osteotomy site.  This was checked in AP and  lateral foot planes.  We then measured and countersinked for placement of jazmyne 3-0,  cannulated screw in standard AO technique. We performed a capsuloraphy to medial first metatarsal phalangeal joint capsule and closed with  2-0 vicryl.  The subcutaneous tissue was reapproximated with 3-0 vicryl and the skin margins were reapproximated with 4-0 vicryl in a running subcuticular suture technique.    The tourniquet was deflated.,  The surgical site was dressed with Steri-Strips Adaptic, 4 x 4's, Kerlix, Ace wrap.    The right foot was then exanguinated with an esmarc bandage and the tourniquet was inflated to 250 mmHg.    Attention was then directed to right foot  where a 5.0 cm incision was made from medial aspect of hallux IPJ proximally to midshaft of   1st metatarsal using a #15 blade.  The incision was carried down throught subcutaneous tissue with care being taken to retract and identify and neurovascular structures.  Any bleeding controlled with bovie electrocautery.  At level of capsule, a medial capsular incision was made.  Any soft tissue was freed from the first MPJ.   Utilized a mcglammery elevator, the sesamoid apparatus was freed.  We then placed a malleable retractor under first metatarsal head and utilized a small power sagital saw to remove the medial emminance of first metatarsal head.  We then redirected our attention to medial aspect of first metatarsal head,  we created a chevron osteotomy in first metatarsal head utilizing a power sagital saw.  All four cortices were breached, thus allowing first metatarsal head to be slid in a lateral direction approximately 3 mm.   We then utilized a k-wire from jazmyne 3-0 cannulated screw set to fixate osteotomy in first metatarsal from proximal to distal across osteotomy site.  This was checked in AP and  lateral foot planes.  We then measured and countersinked for placement of jazmyne 3-0,  cannulated screw in standard AO technique.  This was checked in AP and lateral foot planes and was noted to be in excellent position.  The k-wire was then removed.  The redundant medial shelf was then removed with small power sagittal saw. We then utilized a power reciprocating rasp to round  and rasp smooth the first metatarsal head.  We then flushed the incision with copious amounts of sterile saline. We then directed our attention to the base of proximal phalanx of hallux where using a power sagittal saw we created a triangle shaped osteotomy with the base oriented medially and apex laterally.  Removed 2 mm wedge of bone. We then utilized a k-wire from jazmyne 3-0 cannulated screw set to fixate osteotomy in proximal phalanx of hallux across osteotomy site.  This was checked in AP and  lateral foot planes.  We then measured and countersinked for placement of jazmyne 3-0,  cannulated screw in standard AO technique. We performed a capsuloraphy to medial first metatarsal phalangeal joint capsule and closed with 2-0 vicryl.  The subcutaneous tissue was reapproximated with 3-0 vicryl and the skin margins were reapproximated with 4-0 vicryl in a running subcuticular suture technique.    The tourniquet was deflated.,  The surgical site was dressed with Steri-Strips Adaptic, 4 x 4's, Kerlix, Ace wrap.    The patient tolerated the procedure and anesthesia well.  Patient transferred to recovery room with VSS and VSI to B/L feet.  Following a period of post-operative monitoring the patient will be discharged home.        Complications:  None; patient tolerated the procedure well.    Disposition: PACU - hemodynamically stable.  Condition: stable         Additional Details: as dictated    Attending Attestation: I performed the procedure.    Huma Caraballo  Phone Number: [phone]

## 2024-10-22 ENCOUNTER — HOSPITAL ENCOUNTER (OUTPATIENT)
Dept: RADIOLOGY | Facility: HOSPITAL | Age: 54
Discharge: HOME | End: 2024-10-22
Payer: COMMERCIAL

## 2024-10-22 ENCOUNTER — APPOINTMENT (OUTPATIENT)
Dept: RADIOLOGY | Facility: HOSPITAL | Age: 54
End: 2024-10-22
Payer: COMMERCIAL

## 2024-10-22 VITALS — WEIGHT: 177 LBS | HEIGHT: 67 IN | BODY MASS INDEX: 27.78 KG/M2

## 2024-10-22 DIAGNOSIS — Z12.31 BREAST CANCER SCREENING BY MAMMOGRAM: ICD-10-CM

## 2024-10-22 PROCEDURE — 77067 SCR MAMMO BI INCL CAD: CPT

## 2024-10-23 ENCOUNTER — HOSPITAL ENCOUNTER (OUTPATIENT)
Dept: RADIOLOGY | Facility: EXTERNAL LOCATION | Age: 54
Discharge: HOME | End: 2024-10-23

## 2024-10-23 DIAGNOSIS — Z12.31 ENCOUNTER FOR SCREENING MAMMOGRAM FOR MALIGNANT NEOPLASM OF BREAST: ICD-10-CM

## 2024-10-24 ENCOUNTER — APPOINTMENT (OUTPATIENT)
Dept: RADIOLOGY | Facility: CLINIC | Age: 54
End: 2024-10-24
Payer: COMMERCIAL

## 2025-11-17 ENCOUNTER — APPOINTMENT (OUTPATIENT)
Facility: CLINIC | Age: 55
End: 2025-11-17
Payer: COMMERCIAL

## (undated) DEVICE — DRESSING, GAUZE, SPONGE, 12 PLY, CURITY, 4 X 4 IN, STERILE

## (undated) DEVICE — SUTURE, VICRYL, 3-0, 27 IN, CT-2, UNDYED

## (undated) DEVICE — DRESSING, ABDOMINAL, TENDERSORB, 8 X 10 IN, STERILE

## (undated) DEVICE — SUTURE, VICRYL, 4-0, 18 IN, PS2, UNDYED

## (undated) DEVICE — Device

## (undated) DEVICE — STOCKINETTE, IMPERVIOUS, 9 X 48 IN, STERILE

## (undated) DEVICE — CUFF, TOURNIQUET, 18 X 4, DUAL PORT/SNGL BLADDER, DISP, LF

## (undated) DEVICE — BLADE, SAW, SAGITTAL, MICRO, FLAT, FINE, 9.5 X 25.5 X 0.4 MM, STAINLESS STEEL, STERILE

## (undated) DEVICE — APPLICATOR, CHLORAPREP, W/ORANGE TINT, 26ML

## (undated) DEVICE — BANDAGE, ELASTIC, 4 IN X 11 YDS, STERILE, LF

## (undated) DEVICE — SUTURE, VICRYL, 2-0, 27 IN, FS-1, UNDYED

## (undated) DEVICE — COVER, MINI DRAPE SET, C-ARM, FOR OEC/GE

## (undated) DEVICE — CONTAINER, SPECIMEN, 120ML

## (undated) DEVICE — GOWN, ASTOUND, L

## (undated) DEVICE — TIP, SUCTION, YANKAUER, BULB, ADULT

## (undated) DEVICE — RASP, RECIPROCATING, MICRO, SMALL, CROSS CUT

## (undated) DEVICE — CAUTERY, PENCIL, PUSH BUTTON, SMOKE EVAC, 70MM